# Patient Record
Sex: FEMALE | Race: WHITE | Employment: UNEMPLOYED | URBAN - METROPOLITAN AREA
[De-identification: names, ages, dates, MRNs, and addresses within clinical notes are randomized per-mention and may not be internally consistent; named-entity substitution may affect disease eponyms.]

---

## 2017-02-03 ENCOUNTER — ANESTHESIA EVENT (OUTPATIENT)
Dept: ENDOSCOPY | Age: 57
End: 2017-02-03
Payer: MEDICARE

## 2017-02-03 ENCOUNTER — HOSPITAL ENCOUNTER (OUTPATIENT)
Age: 57
Setting detail: OUTPATIENT SURGERY
Discharge: HOME | End: 2017-02-03
Attending: INTERNAL MEDICINE | Admitting: INTERNAL MEDICINE
Payer: MEDICARE

## 2017-02-03 ENCOUNTER — ANESTHESIA (OUTPATIENT)
Dept: ENDOSCOPY | Age: 57
End: 2017-02-03
Payer: MEDICARE

## 2017-02-03 VITALS
RESPIRATION RATE: 19 BRPM | WEIGHT: 135 LBS | OXYGEN SATURATION: 100 % | TEMPERATURE: 98.6 F | BODY MASS INDEX: 24.84 KG/M2 | SYSTOLIC BLOOD PRESSURE: 139 MMHG | HEIGHT: 62 IN | DIASTOLIC BLOOD PRESSURE: 101 MMHG | HEART RATE: 92 BPM

## 2017-02-03 PROCEDURE — 74011000250 HC RX REV CODE- 250

## 2017-02-03 PROCEDURE — 77030036672 HC NDL BIOP ENDOSC SHRKCOR COVD -D: Performed by: INTERNAL MEDICINE

## 2017-02-03 PROCEDURE — 76040000008: Performed by: INTERNAL MEDICINE

## 2017-02-03 PROCEDURE — 88172 CYTP DX EVAL FNA 1ST EA SITE: CPT | Performed by: INTERNAL MEDICINE

## 2017-02-03 PROCEDURE — 74011250636 HC RX REV CODE- 250/636

## 2017-02-03 PROCEDURE — 88173 CYTOPATH EVAL FNA REPORT: CPT | Performed by: INTERNAL MEDICINE

## 2017-02-03 PROCEDURE — 76060000033 HC ANESTHESIA 1 TO 1.5 HR: Performed by: INTERNAL MEDICINE

## 2017-02-03 RX ORDER — FLUMAZENIL 0.1 MG/ML
0.2 INJECTION INTRAVENOUS
Status: DISCONTINUED | OUTPATIENT
Start: 2017-02-03 | End: 2017-02-03 | Stop reason: HOSPADM

## 2017-02-03 RX ORDER — AMITRIPTYLINE HYDROCHLORIDE 75 MG/1
50 TABLET, FILM COATED ORAL
COMMUNITY

## 2017-02-03 RX ORDER — LIDOCAINE HYDROCHLORIDE 20 MG/ML
INJECTION, SOLUTION EPIDURAL; INFILTRATION; INTRACAUDAL; PERINEURAL AS NEEDED
Status: DISCONTINUED | OUTPATIENT
Start: 2017-02-03 | End: 2017-02-03 | Stop reason: HOSPADM

## 2017-02-03 RX ORDER — SODIUM CHLORIDE, SODIUM LACTATE, POTASSIUM CHLORIDE, CALCIUM CHLORIDE 600; 310; 30; 20 MG/100ML; MG/100ML; MG/100ML; MG/100ML
INJECTION, SOLUTION INTRAVENOUS
Status: DISCONTINUED | OUTPATIENT
Start: 2017-02-03 | End: 2017-02-03 | Stop reason: HOSPADM

## 2017-02-03 RX ORDER — SODIUM CHLORIDE 0.9 % (FLUSH) 0.9 %
5-10 SYRINGE (ML) INJECTION AS NEEDED
Status: DISCONTINUED | OUTPATIENT
Start: 2017-02-03 | End: 2017-02-03 | Stop reason: HOSPADM

## 2017-02-03 RX ORDER — ACETAMINOPHEN 325 MG/1
TABLET ORAL
COMMUNITY

## 2017-02-03 RX ORDER — DIPHENHYDRAMINE HYDROCHLORIDE 50 MG/ML
50 INJECTION, SOLUTION INTRAMUSCULAR; INTRAVENOUS ONCE
Status: DISCONTINUED | OUTPATIENT
Start: 2017-02-03 | End: 2017-02-03 | Stop reason: HOSPADM

## 2017-02-03 RX ORDER — SODIUM CHLORIDE 9 MG/ML
INJECTION, SOLUTION INTRAVENOUS
Status: DISCONTINUED | OUTPATIENT
Start: 2017-02-03 | End: 2017-02-03 | Stop reason: HOSPADM

## 2017-02-03 RX ORDER — PROPOFOL 10 MG/ML
INJECTION, EMULSION INTRAVENOUS AS NEEDED
Status: DISCONTINUED | OUTPATIENT
Start: 2017-02-03 | End: 2017-02-03 | Stop reason: HOSPADM

## 2017-02-03 RX ORDER — ATROPINE SULFATE 0.1 MG/ML
0.5 INJECTION INTRAVENOUS
Status: DISCONTINUED | OUTPATIENT
Start: 2017-02-03 | End: 2017-02-03 | Stop reason: HOSPADM

## 2017-02-03 RX ORDER — EPINEPHRINE 0.1 MG/ML
1 INJECTION INTRACARDIAC; INTRAVENOUS
Status: DISCONTINUED | OUTPATIENT
Start: 2017-02-03 | End: 2017-02-03 | Stop reason: HOSPADM

## 2017-02-03 RX ORDER — ASPIRIN 325 MG
TABLET, DELAYED RELEASE (ENTERIC COATED) ORAL
COMMUNITY

## 2017-02-03 RX ORDER — MIDAZOLAM HYDROCHLORIDE 1 MG/ML
.25-1 INJECTION, SOLUTION INTRAMUSCULAR; INTRAVENOUS
Status: DISCONTINUED | OUTPATIENT
Start: 2017-02-03 | End: 2017-02-03 | Stop reason: HOSPADM

## 2017-02-03 RX ORDER — DEXTROMETHORPHAN/PSEUDOEPHED 2.5-7.5/.8
1.2 DROPS ORAL
Status: DISCONTINUED | OUTPATIENT
Start: 2017-02-03 | End: 2017-02-03 | Stop reason: HOSPADM

## 2017-02-03 RX ORDER — FENTANYL CITRATE 50 UG/ML
100 INJECTION, SOLUTION INTRAMUSCULAR; INTRAVENOUS
Status: DISCONTINUED | OUTPATIENT
Start: 2017-02-03 | End: 2017-02-03 | Stop reason: HOSPADM

## 2017-02-03 RX ORDER — SODIUM CHLORIDE 0.9 % (FLUSH) 0.9 %
5-10 SYRINGE (ML) INJECTION EVERY 8 HOURS
Status: DISCONTINUED | OUTPATIENT
Start: 2017-02-03 | End: 2017-02-03 | Stop reason: HOSPADM

## 2017-02-03 RX ORDER — SODIUM CHLORIDE 9 MG/ML
100 INJECTION, SOLUTION INTRAVENOUS CONTINUOUS
Status: DISCONTINUED | OUTPATIENT
Start: 2017-02-03 | End: 2017-02-03 | Stop reason: HOSPADM

## 2017-02-03 RX ORDER — METHOCARBAMOL 500 MG/1
TABLET, FILM COATED ORAL 4 TIMES DAILY
COMMUNITY

## 2017-02-03 RX ORDER — TRAMADOL HYDROCHLORIDE 50 MG/1
50 TABLET ORAL
COMMUNITY

## 2017-02-03 RX ORDER — NALOXONE HYDROCHLORIDE 0.4 MG/ML
0.4 INJECTION, SOLUTION INTRAMUSCULAR; INTRAVENOUS; SUBCUTANEOUS
Status: DISCONTINUED | OUTPATIENT
Start: 2017-02-03 | End: 2017-02-03 | Stop reason: HOSPADM

## 2017-02-03 RX ADMIN — SODIUM CHLORIDE: 9 INJECTION, SOLUTION INTRAVENOUS at 13:32

## 2017-02-03 RX ADMIN — PROPOFOL 50 MG: 10 INJECTION, EMULSION INTRAVENOUS at 13:12

## 2017-02-03 RX ADMIN — PROPOFOL 50 MG: 10 INJECTION, EMULSION INTRAVENOUS at 13:34

## 2017-02-03 RX ADMIN — LIDOCAINE HYDROCHLORIDE 100 MG: 20 INJECTION, SOLUTION EPIDURAL; INFILTRATION; INTRACAUDAL; PERINEURAL at 13:03

## 2017-02-03 RX ADMIN — SODIUM CHLORIDE: 9 INJECTION, SOLUTION INTRAVENOUS at 12:59

## 2017-02-03 RX ADMIN — PROPOFOL 50 MG: 10 INJECTION, EMULSION INTRAVENOUS at 13:42

## 2017-02-03 RX ADMIN — PROPOFOL 50 MG: 10 INJECTION, EMULSION INTRAVENOUS at 13:47

## 2017-02-03 RX ADMIN — PROPOFOL 50 MG: 10 INJECTION, EMULSION INTRAVENOUS at 13:07

## 2017-02-03 RX ADMIN — PROPOFOL 50 MG: 10 INJECTION, EMULSION INTRAVENOUS at 13:40

## 2017-02-03 RX ADMIN — SODIUM CHLORIDE, SODIUM LACTATE, POTASSIUM CHLORIDE, CALCIUM CHLORIDE: 600; 310; 30; 20 INJECTION, SOLUTION INTRAVENOUS at 14:00

## 2017-02-03 RX ADMIN — PROPOFOL 50 MG: 10 INJECTION, EMULSION INTRAVENOUS at 13:14

## 2017-02-03 RX ADMIN — PROPOFOL 50 MG: 10 INJECTION, EMULSION INTRAVENOUS at 13:28

## 2017-02-03 RX ADMIN — PROPOFOL 50 MG: 10 INJECTION, EMULSION INTRAVENOUS at 13:36

## 2017-02-03 RX ADMIN — PROPOFOL 50 MG: 10 INJECTION, EMULSION INTRAVENOUS at 13:09

## 2017-02-03 RX ADMIN — PROPOFOL 50 MG: 10 INJECTION, EMULSION INTRAVENOUS at 13:38

## 2017-02-03 RX ADMIN — PROPOFOL 50 MG: 10 INJECTION, EMULSION INTRAVENOUS at 13:45

## 2017-02-03 RX ADMIN — PROPOFOL 50 MG: 10 INJECTION, EMULSION INTRAVENOUS at 13:53

## 2017-02-03 RX ADMIN — PROPOFOL 50 MG: 10 INJECTION, EMULSION INTRAVENOUS at 14:00

## 2017-02-03 RX ADMIN — PROPOFOL 50 MG: 10 INJECTION, EMULSION INTRAVENOUS at 13:26

## 2017-02-03 RX ADMIN — PROPOFOL 50 MG: 10 INJECTION, EMULSION INTRAVENOUS at 13:58

## 2017-02-03 RX ADMIN — PROPOFOL 50 MG: 10 INJECTION, EMULSION INTRAVENOUS at 13:24

## 2017-02-03 RX ADMIN — PROPOFOL 50 MG: 10 INJECTION, EMULSION INTRAVENOUS at 13:31

## 2017-02-03 RX ADMIN — PROPOFOL 50 MG: 10 INJECTION, EMULSION INTRAVENOUS at 13:08

## 2017-02-03 RX ADMIN — PROPOFOL 50 MG: 10 INJECTION, EMULSION INTRAVENOUS at 13:05

## 2017-02-03 RX ADMIN — PROPOFOL 50 MG: 10 INJECTION, EMULSION INTRAVENOUS at 13:56

## 2017-02-03 RX ADMIN — PROPOFOL 50 MG: 10 INJECTION, EMULSION INTRAVENOUS at 13:23

## 2017-02-03 RX ADMIN — PROPOFOL 50 MG: 10 INJECTION, EMULSION INTRAVENOUS at 13:04

## 2017-02-03 RX ADMIN — PROPOFOL 50 MG: 10 INJECTION, EMULSION INTRAVENOUS at 13:20

## 2017-02-03 RX ADMIN — PROPOFOL 50 MG: 10 INJECTION, EMULSION INTRAVENOUS at 13:03

## 2017-02-03 RX ADMIN — PROPOFOL 50 MG: 10 INJECTION, EMULSION INTRAVENOUS at 13:49

## 2017-02-03 RX ADMIN — PROPOFOL 50 MG: 10 INJECTION, EMULSION INTRAVENOUS at 13:30

## 2017-02-03 RX ADMIN — PROPOFOL 50 MG: 10 INJECTION, EMULSION INTRAVENOUS at 13:51

## 2017-02-03 RX ADMIN — PROPOFOL 50 MG: 10 INJECTION, EMULSION INTRAVENOUS at 13:17

## 2017-02-03 NOTE — DISCHARGE INSTRUCTIONS
[address] [place] [facility] [place] [address] [place] [address], 689 [facility]    Endoscopic ultrasound DISCHARGE INSTRUCTIONS    Soni Esparza  [id number]  1960    Discomfort:  Sore throat- throat lozenges or warm salt water gargle  redness at IV site- apply warm compress to area; if redness or soreness persist- contact your physician  Gaseous discomfort- walking, belching will help relieve any discomfort  You may not operate a vehicle for 12 hours  You may not engage in an occupation involving machinery or appliances for rest of today  You may not drink alcoholic beverages for at least 12 hours  Avoid making any critical decisions for at least 24 hour  DIET  Please only consume clear liquids today. If you tolerate clear liquids, you may advance your diet to soft solids tomorrow. ACTIVITY  You may resume your normal daily activities   Spend the remainder of the day resting -  avoid any strenuous activity. CALL M.D. ANY SIGN OF   Increasing pain, nausea, vomiting  Abdominal distension (swelling)  New increased bleeding (oral or rectal)  Fever (chills)  Pain in chest area  Bloody discharge from nose or mouth  Shortness of breath    Follow-up Instructions:   Call Dr. Lopez Perez for any questions or problems. Telephone # [phone]    ENDOSCOPY FINDINGS:   Your endoscopic ultrasound showed an area in the pancreas that was concerning for a mass. A biopsy was taken. We will contact you and Dr. Doug Rodriguez about the results when they are available. Signed By: Chely Garcia.  Any Meza MD     2/3/2017  2:07 PM

## 2017-02-03 NOTE — IP AVS SNAPSHOT
[address] 
[phone] Patient: Sreekanth Goncalves MRN: [id number] LUX:[id number] You are allergic to the following Allergen Reactions Depo-Provera (Medroxyprogesterone) Other (comments) Systemic reaction with elevated blood pressure, brain damage Motrin (Ibuprofen) Nausea Only Naprosyn (Naproxen) Nausea Only Prednisone Myalgia Sulfa (Sulfonamide Antibiotics) Nausea and Vomiting Vicodin (Hydrocodone-Acetaminophen) Other (comments) Out of body feeling Recent Documentation Height Weight Breastfeeding? BMI OB Status Smoking Status 1.575 m 61.2 kg No 24.69 kg/m2 Postmenopausal Never Assessed Emergency Contacts Name Discharge Info Relation Home Work Mobile Surjit Lange DISCHARGE CAREGIVER [3] Friend [5] [phone] About your hospitalization You were admitted on:  February 3, 2017 You last received care in the:  [facility] ENDOSCOPY You were discharged on:  February 3, 2017 Unit phone number:  [phone] Why you were hospitalized Your primary diagnosis was:  Not on File Providers Seen During Your Hospitalizations Provider Role Specialty Primary office phone Tu Grant MD Attending Provider Gastroenterology [phone] Your Primary Care Physician (PCP) Primary Care Physician Office Phone Office Fax [phone] [facility],[address] Floor, [facility], [address] [place] [address] [phone] Follow-up Information None Current Discharge Medication List  
  
CONTINUE these medications which have NOT CHANGED Dose & Instructions Dispensing Information Comments Morning Noon Evening Bedtime  
 amitriptyline 75 mg tablet Commonly known as:  ELAVIL Your next dose is: Today, Tomorrow Other:  _________ Dose:  50 mg Take 50 mg by mouth nightly. Refills:  0 amLODIPine 5 mg tab 5 mg, benazepril 10 mg tab 10 mg Your next dose is: Today, Tomorrow Other:  _________ Take  by mouth daily. Refills:  0  
     
   
   
   
  
 amylase-lipase-protease 12,000-38,000 -60,000 unit capsule Commonly known as:  CREON 94911 Your next dose is: Today, Tomorrow Other:  _________ Take  by mouth three (3) times daily (with meals). Not certain of dosage Refills:  0  
     
   
   
   
  
 aspirin delayed-release 325 mg tablet Your next dose is: Today, Tomorrow Other:  _________ Take  by mouth every six (6) hours as needed for Pain. Refills:  0  
     
   
   
   
  
 ATROVENT HFA 17 mcg/actuation inhaler Generic drug:  ipratropium Your next dose is: Today, Tomorrow Other:  _________ Dose:  1 Puff Take 1 Puff by inhalation. Refills:  0 BENTYL PO Your next dose is: Today, Tomorrow Other:  _________ Take  by mouth. Refills:  0 METOPROLOL SUCCINATE PO Your next dose is: Today, Tomorrow Other:  _________ Take  by mouth. Refills:  0 MUCINEX PO Your next dose is: Today, Tomorrow Other:  _________ Take  by mouth. Refills:  0 PANTOPRAZOLE PO Your next dose is: Today, Tomorrow Other:  _________ Take  by mouth. Refills:  0 PROAIR HFA IN Your next dose is: Today, Tomorrow Other:  _________ Take  by inhalation. Refills:  0  
     
   
   
   
  
 ROBAXIN 500 mg tablet Generic drug:  methocarbamol Your next dose is: Today, Tomorrow Other:  _________ Take  by mouth four (4) times daily. Refills:  0 SEROQUEL PO Your next dose is: Today, Tomorrow Other:  _________ Take  by mouth. Refills:  0  
     
   
   
   
  
 traMADol 50 mg tablet Commonly known as:  ULTRAM  
   
Your next dose is: Today, Tomorrow Other:  _________ Dose:  50 mg Take 50 mg by mouth every six (6) hours as needed for Pain. Refills:  0  
     
   
   
   
  
 TYLENOL 325 mg tablet Generic drug:  acetaminophen Your next dose is: Today, Tomorrow Other:  _________ Take  by mouth every four (4) hours as needed for Pain. Refills:  0 ZYRTEC PO Your next dose is: Today, Tomorrow Other:  _________ Take  by mouth. Refills:  0 Discharge Instructions [address] [facility] 
[address] [place] [address] Endoscopic ultrasound DISCHARGE INSTRUCTIONS [address] [id number] 
1960 Discomfort: 
Sore throat- throat lozenges or warm salt water gargle 
redness at IV site- apply warm compress to area; if redness or soreness persist- contact your physician Gaseous discomfort- walking, belching will help relieve any discomfort You may not operate a vehicle for 12 hours You may not engage in an occupation involving machinery or appliances for rest of today You may not drink alcoholic beverages for at least 12 hours Avoid making any critical decisions for at least 24 hour DIET Please only consume clear liquids today. If you tolerate clear liquids, you may advance your diet to soft solids tomorrow. ACTIVITY You may resume your normal daily activities Spend the remainder of the day resting -  avoid any strenuous activity. CALL M.D. ANY SIGN OF Increasing pain, nausea, vomiting Abdominal distension (swelling) New increased bleeding (oral or rectal) Fever (chills) Pain in chest area Bloody discharge from nose or mouth Shortness of breath Follow-up Instructions: 
 Call Dr. Arlette Calhoun for any questions or problems. Telephone # [phone] ENDOSCOPY FINDINGS: 
 Your endoscopic ultrasound showed an area in the pancreas that was concerning for a mass. A biopsy was taken. We will contact you and Dr. Mariya Zhong about the results when they are available. Signed By: Carmelo Clifford. Gita Jones MD   
 2/3/2017  2:07 PM 
  
 
 
 
 
Discharge Orders None Introducing [facility] SERVICES! Patty Kesha introduces [employer] patient portal. Now you can access parts of your medical record, email your doctor's office, and request medication refills online. 1. In your internet browser, go to [email or web address] 2. Click on the First Time User? Click Here link in the Sign In box. You will see the New Member Sign Up page. 3. Enter your [employer] Access Code exactly as it appears below. You will not need to use this code after youve completed the sign-up process. If you do not sign up before the expiration date, you must request a new code. · [employer] Access Code: [email or web address] Expires: 5/4/2017 10:26 AM 
 
4. Enter the last four digits of your Social Security Number (xxxx) and Date of Birth (mm/dd/yyyy) as indicated and click Submit. You will be taken to the next sign-up page. 5. Create a [employer] ID. This will be your [employer] login ID and cannot be changed, so think of one that is secure and easy to remember. 6. Create a [employer] password. You can change your password at any time. 7. Enter your Password Reset Question and Answer. This can be used at a later time if you forget your password. 8. Enter your e-mail address. You will receive e-mail notification when new information is available in [address] 9. Click Sign Up. You can now view and download portions of your medical record. 10. Click the Download Summary menu link to download a portable copy of your medical information.  
 
If you have questions, please visit the Frequently Asked Questions section of the [employer]. Remember, MyChart is NOT to be used for urgent needs. For medical emergencies, dial 911. Now available from your iPhone and Android! General Information Please provide this summary of care documentation to your next provider. Patient Signature:  ____________________________________________________________ Date:  ____________________________________________________________  
  
Agueda Chignik Provider Signature:  ____________________________________________________________ Date:  ____________________________________________________________

## 2017-02-03 NOTE — ANESTHESIA POSTPROCEDURE EVALUATION
Post-Anesthesia Evaluation and Assessment    Patient: Tea Pang MRN: [id number]  SSN: [id number]    YOB: 1960  Age: 61 y.o. Sex: female       Cardiovascular Function/Vital Signs  Visit Vitals    BP (!) 77/16    Pulse 93    Temp 37 °C (98.6 °F)    Resp 27    Ht 5' 2\" (1.575 m)    Wt 61.2 kg (135 lb)    SpO2 100%    Breastfeeding No    BMI 24.69 kg/m2       Patient is status post MAC anesthesia for Procedure(s):  LINEAR EUS, EGD WITH FNA  ESOPHAGOGASTRODUODENOSCOPY (EGD)  FINE NEEDLE ASPIRATION. Nausea/Vomiting: None    Postoperative hydration reviewed and adequate. Pain:  Pain Scale 1: Numeric (0 - 10) (02/03/17 1421)  Pain Intensity 1: 4 (02/03/17 1421)   Managed    Neurological Status: At baseline    Mental Status and Level of Consciousness: Arousable    Pulmonary Status:   O2 Device: Room air (02/03/17 1427)   Adequate oxygenation and airway patent    Complications related to anesthesia: None    Post-anesthesia assessment completed.  No concerns    Signed By: Greg De La Torre MD     February 3, 2017

## 2017-02-03 NOTE — H&P
[address]  [address] [facility], [address] [place] [address]      History and Physical       NAME:  Terrence Burnham   :   1960   MRN:   [id number]             History of Present Illness:  Patient is a 61 y.o. who is seen for suspected pancreatic duct stone and possible pancreatic head mass. Patient has a history of chronic pancreatitis. She also has a suspected biliary stricture on CT. She presents for EUS and possible FNA. PMH:  Past Medical History   Diagnosis Date    Arthritis     Asthma     Hypertension     Ill-defined condition      TMJ    Menopause      Age 47-54, unsure exactley       PSH:  Past Surgical History   Procedure Laterality Date    Hx other surgical       lap ashvin       Allergies: Allergies   Allergen Reactions    Depo-Provera [Medroxyprogesterone] Other (comments)     Systemic reaction with elevated blood pressure, brain damage    Motrin [Ibuprofen] Nausea Only    Naprosyn [Naproxen] Nausea Only    Prednisone Myalgia    Sulfa (Sulfonamide Antibiotics) Nausea and Vomiting    Vicodin [Hydrocodone-Acetaminophen] Other (comments)     Out of body feeling       Home Medications:  Prior to Admission Medications   Prescriptions Last Dose Informant Patient Reported? Taking? ALBUTEROL SULFATE (PROAIR HFA IN) 2017 at Unknown time  Yes Yes   Sig: Take  by inhalation. CETIRIZINE HCL (ZYRTEC PO) 2017 at Unknown time  Yes Yes   Sig: Take  by mouth. DICYCLOMINE HCL (BENTYL PO) 2017 at Unknown time  Yes Yes   Sig: Take  by mouth. GUAIFENESIN (MUCINEX PO) 2017 at Unknown time  Yes Yes   Sig: Take  by mouth. METOPROLOL SUCCINATE PO 2017 at Unknown time  Yes Yes   Sig: Take  by mouth. PANTOPRAZOLE SODIUM (PANTOPRAZOLE PO) 2017 at Unknown time  Yes Yes   Sig: Take  by mouth. QUETIAPINE FUMARATE (SEROQUEL PO) 2017 at Unknown time  Yes Yes   Sig: Take  by mouth.    acetaminophen (TYLENOL) 325 mg tablet 2017 at Unknown time  Yes Yes Sig: Take  by mouth every four (4) hours as needed for Pain. amLODIPine 5 mg tab 5 mg, benazepril 10 mg tab 10 mg 2/2/2017 at Unknown time  Yes Yes   Sig: Take  by mouth daily. amitriptyline (ELAVIL) 75 mg tablet 2/2/2017 at Unknown time  Yes Yes   Sig: Take 50 mg by mouth nightly. amylase-lipase-protease (CREON 52609) 12,000-38,000 -60,000 unit capsule 2/2/2017 at Unknown time  Yes Yes   Sig: Take  by mouth three (3) times daily (with meals). Not certain of dosage   aspirin delayed-release 325 mg tablet 1/28/2017  Yes Yes   Sig: Take  by mouth every six (6) hours as needed for Pain.   ipratropium (ATROVENT HFA) 17 mcg/actuation inhaler 2/3/2017 at Unknown time  Yes Yes   Sig: Take 1 Puff by inhalation. methocarbamol (ROBAXIN) 500 mg tablet 2/2/2017 at Unknown time  Yes Yes   Sig: Take  by mouth four (4) times daily. traMADol (ULTRAM) 50 mg tablet 2/2/2017 at Unknown time  Yes Yes   Sig: Take 50 mg by mouth every six (6) hours as needed for Pain. Facility-Administered Medications: None       Hospital Medications:  No current facility-administered medications for this encounter. Social History:  Social History   Substance Use Topics    Smoking status: Not on file    Smokeless tobacco: Not on file    Alcohol use Not on file       Family History:  No family history on file.           Review of Systems:      Constitutional: negative fever, negative chills, negative weight loss  Eyes:   negative visual changes  ENT:   negative sore throat, tongue or lip swelling  Respiratory:  negative cough, negative dyspnea  Cards:  negative for chest pain, palpitations, lower extremity edema  GI:   See HPI  :  negative for frequency, dysuria  Integument:  negative for rash and pruritus  Heme:  negative for easy bruising and gum/nose bleeding  Musculoskel: negative for myalgias,  back pain and muscle weakness  Neuro: negative for headaches, dizziness, vertigo  Psych:  negative for feelings of anxiety, depression       Objective:   Patient Vitals for the past 8 hrs:   BP Temp Pulse Resp SpO2 Height Weight   02/03/17 1215 156/87 98.5 °F (36.9 °C) 85 16 100 % 5' 2\" (1.575 m) 61.2 kg (135 lb)             EXAM:     NEURO-a&o   HEENT-wnl   LUNGS-clear    COR-regular rate and rhythym     ABD-soft , no tenderness, no rebound, good bs     EXT-no edema     Data Review     No results for input(s): WBC, HGB, HCT, PLT, HGBEXT, HCTEXT, PLTEXT in the last 72 hours. No results for input(s): NA, K, CL, CO2, BUN, CREA, GLU, PHOS, CA in the last 72 hours. No results for input(s): SGOT, GPT, AP, TBIL, TP, ALB, GLOB, GGT, AML, LPSE in the last 72 hours. No lab exists for component: AMYP, HLPSE  No results for input(s): INR, PTP, APTT in the last 72 hours. No lab exists for component: INREXT       Assessment:   · Chronic pancreatitis  · Possible pancreatic duct stone  · Possible pancreatic mass  · Biliary stricture   There is no problem list on file for this patient. Plan:   · Endoscopic procedure with MAC     Signed By: Norberto Jain.  Feli Srinivasan MD     2/3/2017  12:41 PM

## 2017-02-03 NOTE — ROUTINE PROCESS
Jonathon Petra  1960  [id number]    Situation:  Verbal report received from: Rina   Procedure: Procedure(s):  LINEAR EUS, EGD WITH FNA  ESOPHAGOGASTRODUODENOSCOPY (EGD)  FINE NEEDLE ASPIRATION    Background:    Preoperative diagnosis: CHRONIC PANCREATITIS  Postoperative diagnosis: Chronic pancreatitis, pancreatic mass    :  Dr. Valentina Parks  Assistant(s): Endoscopy Technician-1: Julito Burnham  Endoscopy RN-1: Rosa Kelly RN  Endoscopy RN-Relief: Chio Jones RN    Specimens: * No specimens in log *  H. Pylori  no    Assessment:  Intra-procedure medications     Anesthesia gave intra-procedure sedation and medications, see anesthesia flow sheet yes    Intravenous fluids: NS@ KVO     Vital signs stable     Abdominal assessment: round and soft   Recommendation:  Discharge patient per MD order.   Family or Friend Friend Jey Presley  Permission to share finding with family or friend yes

## 2017-02-03 NOTE — PERIOP NOTES
Patient has been evaluated by anesthesia pre-procedure. Patient alert and oriented. Vital signs will not be charted by the Endoscopy nurse. All vitals, airway, and loc are monitored by anesthesia staff throughout procedure.

## 2017-02-03 NOTE — PROCEDURES
[address] [place] [facility] 12, 236 [facility]         Endoscopic Ultrasound    NAME:  Pepito Cohen   :   1960   MRN:   [id number]       Procedure Type: Endoscopic Ultrasound with fine needle aspiration    Indications: 61year old female with chronic pancreatitis, recent CT with biliary stricture, dilated main pancreatic duct with suspected stones and suspicion for a pancreatic head mass. Pre-operative Diagnosis: see indication above    Post-operative Diagnosis:  See findings below    : Amos Mccann. Jeff Bain MD    Referring Provider: -BEKA Teixeira MD, -Jean-Paul Calhoun MD    Anethesia/Sedation:  MAC anesthesia Propofol      Procedure Details     After informed consent was obtained for the procedure, with all risks and benefits of procedure explained the patient was taken to the endoscopy suite and placed in the left lateral decubitus position. Following sequential administration of sedation as per above, an EGD was performed. Findings are listed below. Next, the linear echoendoscope was inserted into the mouth and advanced under direct vision to the second portion of the duodenum. Findings:     Endoscopic:   Esophagus:normal   Stomach: normal    Duodenum: normal      Ultrasound:   Esophagus: normal   Stomach: normal   Pancreas:     Areas examined: the entire gland    Parenchyma: The pancreatic head was abnormal appearing. There was a 3 cm well-defined mass-like lesion that was hypoechoic. There were hyperechoic foci that were consistent with calcifications. The main pancreatic duct was dilated between 7 mm and 10 mm in the surrounding region. There were multiple hyperechoic foci within the main pancreatic duct concerning for stones. The bile duct traversed through the mass-like lesion and was narrowed within this segment. Color doppler was used to rule out overlying vessels, and using a 25 g  [employer] needle a fine needle core biopsy X 4 passes was performed. Cytopathology team was present to confirm specimen adequacy. The remainder of the pancreas was atrophic appearing. Pancreatic Duct: as above   Liver:     Parenchyma: visualized portions were normal appearing   Gallbladder: absent   Bile Duct: dilated to 10 mm above the level of the pancreatic head with distinct narrowing through the region of the mass-like lesion in the pancreatic head   Lymph Node: multiple 2-4 mm skyler-pancreatic hypoechoic lymph nodes, round, well-defined. None exceeded 1 cm in diameter. Specimen Removed:  1. Head of pancreas - fine needle core biopsy    Complications: None. EBL:  None. Interventions: see above    Impression:  1. Head of pancreas mass-like lesion - now status post fine needle core biopsy with final results pending  2. Background of chronic pancreatitis with parenchymal calcifications  3. Dilated main pancreatic duct with pancreatic duct stones  4. Biliary stricture    Recommendations:   1. Follow up cytology results  2. Follow up recent CA 19-9 level  3. Next step to be determined based on cytology results    Signed By: Anamika Daniels.  Tiffanie Hernandez MD     2/3/2017  2:09 PM

## 2017-02-07 ENCOUNTER — HOSPITAL ENCOUNTER (OUTPATIENT)
Dept: ULTRASOUND IMAGING | Age: 57
Discharge: HOME | End: 2017-02-07
Attending: INTERNAL MEDICINE

## (undated) DEVICE — BW-400L DISP SNGL-END CLEANINGBRUSH: Brand: OLYMPUS

## (undated) DEVICE — SET ADMIN 16ML TBNG L100IN 2 Y INJ SITE IV PIGGY BK DISP

## (undated) DEVICE — CATH IV AUTOGRD BC BLU 22GA 25 -- INSYTE

## (undated) DEVICE — SYRINGE MED 20ML STD CLR PLAS LUERLOCK TIP N CTRL DISP

## (undated) DEVICE — BW-412T DISP COMBO CLEANING BRUSH: Brand: SINGLE USE COMBINATION CLEANING BRUSH

## (undated) DEVICE — KENDALL RADIOLUCENT FOAM MONITORING ELECTRODE -RECTANGULAR SHAPE: Brand: KENDALL

## (undated) DEVICE — ENDO CARRY-ON PROCEDURE KIT INCLUDES ENZYMATIC SPONGE, GAUZE, BIOHAZARD LABEL, TRAY, LUBRICANT, DIRTY SCOPE LABEL, WATER LABEL, TRAY, DRAWSTRING PAD, AND DEFENDO 4-PIECE KIT.: Brand: ENDO CARRY-ON PROCEDURE KIT

## (undated) DEVICE — BAG BELONG PT PERS CLEAR HANDL

## (undated) DEVICE — SOLIDIFIER FLUID 3000 CC ABSORB

## (undated) DEVICE — Device: Brand: SINGLE USE SOFT BRUSH

## (undated) DEVICE — NEEDLE HYPO 18GA L1.5IN PNK S STL HUB POLYPR SHLD REG BVL

## (undated) DEVICE — CANN NASAL O2 CAPNOGRAPHY AD -- FILTERLINE

## (undated) DEVICE — KIT COMPLIANCE W ENDOGLDE + 11 NO BRSH ENDOKT

## (undated) DEVICE — Device

## (undated) DEVICE — NEONATAL-ADULT SPO2 SENSOR: Brand: NELLCOR

## (undated) DEVICE — FINE NEEDLE BIOPSY NEEDLE: Brand: SHARKCORE

## (undated) DEVICE — SET EXTN TBNG L BOR 4 W STPCOCK ST 32IN PRIMING VOL 6ML

## (undated) DEVICE — KIT IV STRT W CHLORAPREP PD 1ML

## (undated) DEVICE — 1200 GUARD II KIT W/5MM TUBE W/O VAC TUBE: Brand: GUARDIAN